# Patient Record
Sex: FEMALE | Race: WHITE | NOT HISPANIC OR LATINO
[De-identification: names, ages, dates, MRNs, and addresses within clinical notes are randomized per-mention and may not be internally consistent; named-entity substitution may affect disease eponyms.]

---

## 2017-05-05 ENCOUNTER — APPOINTMENT (OUTPATIENT)
Dept: OTOLARYNGOLOGY | Facility: CLINIC | Age: 72
End: 2017-05-05

## 2017-05-05 VITALS
SYSTOLIC BLOOD PRESSURE: 141 MMHG | HEART RATE: 73 BPM | TEMPERATURE: 97.8 F | DIASTOLIC BLOOD PRESSURE: 85 MMHG | OXYGEN SATURATION: 98 %

## 2017-05-05 DIAGNOSIS — J32.2 CHRONIC ETHMOIDAL SINUSITIS: ICD-10-CM

## 2017-07-06 ENCOUNTER — APPOINTMENT (OUTPATIENT)
Dept: RADIATION ONCOLOGY | Facility: CLINIC | Age: 72
End: 2017-07-06

## 2017-07-06 ENCOUNTER — OUTPATIENT (OUTPATIENT)
Dept: OUTPATIENT SERVICES | Facility: HOSPITAL | Age: 72
LOS: 1 days | Discharge: HOME | End: 2017-07-06

## 2017-12-15 ENCOUNTER — APPOINTMENT (OUTPATIENT)
Dept: OTOLARYNGOLOGY | Facility: CLINIC | Age: 72
End: 2017-12-15
Payer: COMMERCIAL

## 2017-12-15 VITALS
SYSTOLIC BLOOD PRESSURE: 132 MMHG | DIASTOLIC BLOOD PRESSURE: 86 MMHG | HEART RATE: 81 BPM | TEMPERATURE: 98.2 F | OXYGEN SATURATION: 98 %

## 2017-12-15 PROCEDURE — 92557 COMPREHENSIVE HEARING TEST: CPT

## 2017-12-15 PROCEDURE — 99214 OFFICE O/P EST MOD 30 MIN: CPT

## 2017-12-15 PROCEDURE — 92550 TYMPANOMETRY & REFLEX THRESH: CPT

## 2018-12-05 ENCOUNTER — APPOINTMENT (OUTPATIENT)
Dept: NEUROSURGERY | Facility: CLINIC | Age: 73
End: 2018-12-05
Payer: COMMERCIAL

## 2018-12-05 PROCEDURE — 99214 OFFICE O/P EST MOD 30 MIN: CPT

## 2018-12-05 RX ORDER — AZITHROMYCIN 250 MG/1
250 TABLET, FILM COATED ORAL
Qty: 6 | Refills: 0 | Status: DISCONTINUED | COMMUNITY
Start: 2017-05-05 | End: 2018-12-05

## 2018-12-17 ENCOUNTER — APPOINTMENT (OUTPATIENT)
Dept: RADIATION ONCOLOGY | Facility: CLINIC | Age: 73
End: 2018-12-17
Payer: COMMERCIAL

## 2018-12-17 VITALS
HEART RATE: 75 BPM | WEIGHT: 204.81 LBS | HEIGHT: 60 IN | SYSTOLIC BLOOD PRESSURE: 139 MMHG | DIASTOLIC BLOOD PRESSURE: 87 MMHG | RESPIRATION RATE: 16 BRPM | BODY MASS INDEX: 40.21 KG/M2

## 2018-12-17 PROCEDURE — 99215 OFFICE O/P EST HI 40 MIN: CPT

## 2018-12-17 NOTE — PHYSICAL EXAM
[General Appearance - Well Developed] : well developed [General Appearance - Well Nourished] : well nourished [Normal] : oriented to person, place and time, the affect was normal, the mood was normal and not anxious [Oriented To Time, Place, And Person] : oriented to person, place, and time

## 2018-12-17 NOTE — VITALS
[Maximal Pain Intensity: 0/10] : 0/10 [Least Pain Intensity: 0/10] : 0/10 [90: Able to carry normal activity; minor signs or symptoms of disease.] : 90: Able to carry normal activity; minor signs or symptoms of disease.  [Date: ____________] : Patient's last distress assessment performed on [unfilled].

## 2018-12-19 ENCOUNTER — FORM ENCOUNTER (OUTPATIENT)
Age: 73
End: 2018-12-19

## 2018-12-20 ENCOUNTER — OUTPATIENT (OUTPATIENT)
Dept: OUTPATIENT SERVICES | Facility: HOSPITAL | Age: 73
LOS: 1 days | End: 2018-12-20
Payer: COMMERCIAL

## 2018-12-20 ENCOUNTER — APPOINTMENT (OUTPATIENT)
Dept: NEUROSURGERY | Facility: CLINIC | Age: 73
End: 2018-12-20
Payer: COMMERCIAL

## 2018-12-20 ENCOUNTER — APPOINTMENT (OUTPATIENT)
Dept: MRI IMAGING | Facility: IMAGING CENTER | Age: 73
End: 2018-12-20

## 2018-12-20 DIAGNOSIS — D33.3 BENIGN NEOPLASM OF CRANIAL NERVES: ICD-10-CM

## 2018-12-20 PROCEDURE — A9585: CPT

## 2018-12-20 PROCEDURE — 61800 APPLY SRS HEADFRAME ADD-ON: CPT

## 2018-12-20 PROCEDURE — 82565 ASSAY OF CREATININE: CPT

## 2018-12-20 PROCEDURE — 61798 SRS CRANIAL LESION COMPLEX: CPT

## 2018-12-20 PROCEDURE — 76498 UNLISTED MR PROCEDURE: CPT

## 2018-12-20 NOTE — REVIEW OF SYSTEMS
[Fatigue] : fatigue [Loss of Hearing] : loss of hearing [Easy Bruising] : a tendency for easy bruising [Negative] : Psychiatric [Fever] : no fever [Chills] : no chills [Night Sweats] : no night sweats [FreeTextEntry4] : decreased hearing to right ear when daignosed with schwannoma, 3 years ago.  [de-identified] : once in a while has slight trouble with balance and leaning to one side or another, denies headaches.

## 2018-12-20 NOTE — HISTORY OF PRESENT ILLNESS
[FreeTextEntry1] : Ms. Littlejohn presents today for consideration for radiation therapy for a right sided acoustic neuroma. \par \par She was initially diagnosed in 1/2016 at which time she presented with right sided sensorineural hearing loss.  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 She was treated with a short course of steroids and recommended surveillance imaging. MRI 7/13/16 showed the lesion stable in size measuring 7X 4 mm.  5/30/17 MRI showed increase in size of the lesion now measuring 1.2 cm with slight protrusion into the CPA angle.  She has notably seen numerous neurosurgeons and ENTs and radiation oncologists who have recommended definitive radiosurgery. \par \par Over the last year she has had episodse of vertigo that resolve.  Her hearing is slightly worse in the right ear.  She denies any gait difficulty, facial numbness, facial weakness, double vision, decreased coordination, or change in her swallowing. \par

## 2018-12-28 DIAGNOSIS — D33.3 BENIGN NEOPLASM OF CRANIAL NERVES: ICD-10-CM

## 2019-02-07 ENCOUNTER — APPOINTMENT (OUTPATIENT)
Dept: RADIATION ONCOLOGY | Facility: CLINIC | Age: 74
End: 2019-02-07
Payer: COMMERCIAL

## 2019-02-07 VITALS
DIASTOLIC BLOOD PRESSURE: 87 MMHG | TEMPERATURE: 98.78 F | SYSTOLIC BLOOD PRESSURE: 141 MMHG | RESPIRATION RATE: 16 BRPM | HEART RATE: 88 BPM | OXYGEN SATURATION: 100 %

## 2019-02-07 VITALS — WEIGHT: 206.9 LBS | BODY MASS INDEX: 40.41 KG/M2

## 2019-02-07 PROCEDURE — 99024 POSTOP FOLLOW-UP VISIT: CPT

## 2019-02-07 NOTE — REVIEW OF SYSTEMS
[Negative] : Heme/Lymph [Dysphagia] : no dysphagia [Gait Disturbance] : no gait disturbance [Confused] : no confusion [Dizziness] : no dizziness [Fainting] : no fainting [Difficulty Walking] : no difficulty walking [FreeTextEntry4] : hearing is decreased on the right side

## 2019-02-07 NOTE — LETTER CLOSING
[Sincerely yours,] : Sincerely yours, [FreeTextEntry3] : Adama Aceves MD\par Attending Physician\par Department of Radiation Medicine\par \par \par

## 2019-02-07 NOTE — PHYSICAL EXAM
[General Appearance - Well Developed] : well developed [General Appearance - Well Nourished] : well nourished [No Focal Deficits] : no focal deficits [Normal] : oriented to person, place and time, the affect was normal, the mood was normal and not anxious [Oriented To Time, Place, And Person] : oriented to person, place, and time

## 2019-05-08 ENCOUNTER — FORM ENCOUNTER (OUTPATIENT)
Age: 74
End: 2019-05-08

## 2019-05-09 ENCOUNTER — APPOINTMENT (OUTPATIENT)
Dept: NEUROSURGERY | Facility: CLINIC | Age: 74
End: 2019-05-09
Payer: COMMERCIAL

## 2019-05-09 ENCOUNTER — OUTPATIENT (OUTPATIENT)
Dept: OUTPATIENT SERVICES | Facility: HOSPITAL | Age: 74
LOS: 1 days | End: 2019-05-09
Payer: COMMERCIAL

## 2019-05-09 ENCOUNTER — APPOINTMENT (OUTPATIENT)
Dept: MRI IMAGING | Facility: IMAGING CENTER | Age: 74
End: 2019-05-09
Payer: COMMERCIAL

## 2019-05-09 DIAGNOSIS — H91.21 SUDDEN IDIOPATHIC HEARING LOSS, RIGHT EAR: ICD-10-CM

## 2019-05-09 DIAGNOSIS — G51.31 CLONIC HEMIFACIAL SPASM, RIGHT: ICD-10-CM

## 2019-05-09 DIAGNOSIS — D33.3 BENIGN NEOPLASM OF CRANIAL NERVES: ICD-10-CM

## 2019-05-09 PROCEDURE — 70553 MRI BRAIN STEM W/O & W/DYE: CPT | Mod: 26

## 2019-05-09 PROCEDURE — 99212 OFFICE O/P EST SF 10 MIN: CPT

## 2019-05-09 PROCEDURE — 70553 MRI BRAIN STEM W/O & W/DYE: CPT

## 2019-05-09 PROCEDURE — A9585: CPT

## 2019-05-10 VITALS
SYSTOLIC BLOOD PRESSURE: 138 MMHG | HEART RATE: 102 BPM | DIASTOLIC BLOOD PRESSURE: 84 MMHG | TEMPERATURE: 209.84 F | RESPIRATION RATE: 16 BRPM | OXYGEN SATURATION: 99 %

## 2019-05-10 RX ORDER — PREDNISONE 20 MG/1
20 TABLET ORAL DAILY
Qty: 30 | Refills: 0 | Status: DISCONTINUED | COMMUNITY
Start: 2019-02-07 | End: 2019-05-10

## 2019-05-10 NOTE — ASSESSMENT
[FreeTextEntry1] : Discussion:\par -Reviewed MRI results with the patient. MRI- tumor is slightly larger as expected. This change is directly related to treatment changes from the gamma knife. It is expects to decrease in slightly over the next 12 months. \par -Next MRI can be done without contrast in 1 year. \par -Office evaluation in 1 year or sooner if new symptoms develop.

## 2019-05-10 NOTE — REASON FOR VISIT
[Follow-Up: _____] : a [unfilled] follow-up visit [FreeTextEntry1] : Patient with three episodes of right facial spasm. [place] episodes on Saturday and one on Monday morning. Each episode lasted less than a minute and resolved without intervention. No other symptoms. No hearing the right ear.\par Facial sensation and muscle strength intact. No headaches, dizziness, n/v.

## 2019-05-10 NOTE — PHYSICAL EXAM
[General Appearance - In No Acute Distress] : in no acute distress [General Appearance - Alert] : alert [Affect] : the affect was normal [Oriented To Time, Place, And Person] : oriented to person, place, and time [Impaired Insight] : insight and judgment were intact [Person] : oriented to person [Time] : oriented to time [Place] : oriented to place [Span Intact] : the attention span was normal [Remote Intact] : remote memory intact [Short Term Intact] : short term memory intact [Concentration Intact] : normal concentrating ability [Fluency] : fluency intact [Current Events] : adequate knowledge of current events [Comprehension] : comprehension intact [Past History] : adequate knowledge of personal past history [Cranial Nerves Oculomotor (III)] : extraocular motion intact [Vocabulary] : adequate range of vocabulary [Cranial Nerves Optic (II)] : visual acuity intact bilaterally,  pupils equal round and reactive to light [Cranial Nerves Facial (VII)] : face symmetrical [Cranial Nerves Vestibulocochlear (VIII)] : hearing was intact bilaterally [Cranial Nerves Trigeminal (V)] : facial sensation intact symmetrically [Cranial Nerves Accessory (XI - Cranial And Spinal)] : head turning and shoulder shrug symmetric [Cranial Nerves Glossopharyngeal (IX)] : tongue and palate midline [Cranial Nerves Hypoglossal (XII)] : there was no tongue deviation with protrusion [Motor Strength] : muscle strength was normal in all four extremities [No Muscle Atrophy] : normal bulk in all four extremities [Romberg's Sign] : Romberg's sign was negtive [Motor Handedness Right-Handed] : the patient is right hand dominant [Sensation Tactile Decrease] : light touch was intact [Past-pointing] : there was no past-pointing [Balance] : balance was intact [Tremor] : no tremor present [2+] : Patella left 2+ [Sclera] : the sclera and conjunctiva were normal [PERRL With Normal Accommodation] : pupils were equal in size, round, reactive to light, with normal accommodation [Extraocular Movements] : extraocular movements were intact [20/___] : both eyes 20/[unfilled] [Full Visual Field] : full visual field [Hearing Threshold Finger Rub Not [place]] : hearing was normal [Outer Ear] : the ears and nose were normal in appearance [Neck Cervical Mass (___cm)] : no neck mass was observed [Oropharynx] : the oropharynx was normal [Neck Appearance] : the appearance of the neck was normal [Thyroid Diffuse Enlargement] : the thyroid was not enlarged [Thyroid Nodule] : there were no palpable thyroid nodules [Jugular Venous Distention Increased] : there was no jugular-venous distention [Auscultation Breath Sounds / Voice Sounds] : lungs were clear to auscultation bilaterally [Abnormal Walk] : normal gait [Motor Tone] : muscle strength and tone were normal [Musculoskeletal - Swelling] : no joint swelling seen [Nail Clubbing] : no clubbing  or cyanosis of the fingernails [] : no rash [Skin Turgor] : normal skin turgor [Skin Color & Pigmentation] : normal skin color and pigmentation

## 2019-05-10 NOTE — DATA REVIEWED
[de-identified] : \par \par EXAM: MR BRAIN WAW IC \par \par \par PROCEDURE DATE: 05/09/2019 \par \par \par \par INTERPRETATION: Contrast-enhanced MRI of the brain. \par \par CLINICAL INDICATION: Follow-up right-sided vestibular schwannoma. \par \par TECHNIQUE: Multiplanar, multisequence MR images of the brain were obtained \par before and after the intravenous administration of 10 cc of Gadavist. 0 cc \par were discarded. Special attention was paid to the IACs. \par \par COMPARISON: MRI brain 12/20/2018 \par \par FINDINGS: \par \par There is a 1.7 x 0.9 cm enhancing lesion in the right cerebellopontine angle \par and right internal auditory canal consistent with the presence of a \par vestibular schwannoma. Previously, the lesion measured 1.4 x 0.8 cm. No mass \par effect upon the brainstem. \par \par No hydrocephalus, midline shift, mass effect, acute intracranial hemorrhage, \par or acute infarction. Mild white matter microvascular ischemic disease is \par noted. Signal voids are seen within the major intracranial vessels \par consistent with their patency. \par \par No abnormal parenchymal or leptomeningeal enhancement. \par \par Small polyp/retention cyst in left maxillary sinus, otherwise the visualized \par paranasal sinuses and mastoid air cells are clear. The orbits, sellar and \par suprasellar structures, and craniocervical junction are unremarkable. \par \par The calvarial and visualized vertebral marrow signal is low on T1-weighted \par images suggesting the presence of anemia or an infiltrative process. \par \par IMPRESSION: \par \par Slight interval increase in size of a right vestibular schwannoma, currently \par 1.7 x 0.9 cm, previously 1.4 x 0.8 cm. No mass effect upon the brainstem. \par \par No abnormal parenchymal or leptomeningeal enhancement. \par \par No acute intracranial hemorrhage, mass effect, vasogenic edema, or evidence \par of acute territorial infarct. \par \par Calvarial and visualized vertebral marrow signal is low on T1-weighted \par images suggesting the presence of anemia or an infiltrative process. \par \par \par \par \par \par \par \par \par \par \par \par SU POWELL M.D., ATTENDING RADIOLOGIST \par This document has been electronically signed. May 9 2019 4:45PM \par \par \par \par \par \par \par    \par \par \par \par \par \par \par \par \par \par \par \par \par \par \par \par      \par \par \par EXAM: MR BRAIN WAW IC \par \par \par PROCEDURE DATE: 05/09/2019 \par \par \par \par INTERPRETATION: Contrast-enhanced MRI of the brain. \par \par CLINICAL INDICATION: Follow-up right-sided vestibular schwannoma. \par \par TECHNIQUE: Multiplanar, multisequence MR images of the brain were obtained \par before and after the intravenous administration of 10 cc of Gadavist. 0 cc \par were discarded. Special attention was paid to the IACs. \par \par COMPARISON: MRI brain 12/20/2018 \par \par FINDINGS: \par \par There is a 1.7 x 0.9 cm enhancing lesion in the right cerebellopontine angle \par and right internal auditory canal consistent with the presence of a \par vestibular schwannoma. Previously, the lesion measured 1.4 x 0.8 cm. No mass \par effect upon the brainstem. \par \par No hydrocephalus, midline shift, mass effect, acute intracranial hemorrhage, \par or acute infarction. Mild white matter microvascular ischemic disease is \par noted. Signal voids are seen within the major intracranial vessels \par consistent with their patency. \par \par No abnormal parenchymal or leptomeningeal enhancement. \par \par Small polyp/retention cyst in left maxillary sinus, otherwise the visualized \par paranasal sinuses and mastoid air cells are clear. The orbits, sellar and \par suprasellar structures, and craniocervical junction are unremarkable. \par \par The calvarial and visualized vertebral marrow signal is low on T1-weighted \par images suggesting the presence of anemia or an infiltrative process. \par \par IMPRESSION: \par \par Slight interval increase in size of a right vestibular schwannoma, currently \par 1.7 x 0.9 cm, previously 1.4 x 0.8 cm. No mass effect upon the brainstem. \par \par No abnormal parenchymal or leptomeningeal enhancement. \par \par No acute intracranial hemorrhage, mass effect, vasogenic edema, or evidence \par of acute territorial infarct. \par \par Calvarial and visualized vertebral marrow signal is low on T1-weighted \par images suggesting the presence of anemia or an infiltrative process. \par \par \par \par \par \par \par \par \par \par \par \par SU POWELL M.D., ATTENDING RADIOLOGIST \par This document has been electronically signed. May 9 2019 4:45PM \par \par \par \par \par \par \par    \par \par \par \par EXAM: MR BRAIN WAW IC \par \par \par PROCEDURE DATE: 05/09/2019 \par \par \par \par INTERPRETATION: Contrast-enhanced MRI of the brain. \par \par CLINICAL INDICATION: Follow-up right-sided vestibular schwannoma. \par \par TECHNIQUE: Multiplanar, multisequence MR images of the brain were obtained \par before and after the intravenous administration of 10 cc of Gadavist. 0 cc \par were discarded. Special attention was paid to the IACs. \par \par COMPARISON: MRI brain 12/20/2018 \par \par FINDINGS: \par \par There is a 1.7 x 0.9 cm enhancing lesion in the right cerebellopontine angle \par and right internal auditory canal consistent with the presence of a \par vestibular schwannoma. Previously, the lesion measured 1.4 x 0.8 cm. No mass \par effect upon the brainstem. \par \par No hydrocephalus, midline shift, mass effect, acute intracranial hemorrhage, \par or acute infarction. Mild white matter microvascular ischemic disease is \par noted. Signal voids are seen within the major intracranial vessels \par consistent with their patency. \par \par No abnormal parenchymal or leptomeningeal enhancement. \par \par Small polyp/retention cyst in left maxillary sinus, otherwise the visualized \par paranasal sinuses and mastoid air cells are clear. The orbits, sellar and \par suprasellar structures, and craniocervical junction are unremarkable. \par \par The calvarial and visualized vertebral marrow signal is low on T1-weighted \par images suggesting the presence of anemia or an infiltrative process. \par \par IMPRESSION: \par \par Slight interval increase in size of a right vestibular schwannoma, currently \par 1.7 x 0.9 cm, previously 1.4 x 0.8 cm. No mass effect upon the brainstem. \par \par No abnormal parenchymal or leptomeningeal enhancement. \par \par No acute intracranial hemorrhage, mass effect, vasogenic edema, or evidence \par of acute territorial infarct. \par \par Calvarial and visualized vertebral marrow signal is low on T1-weighted \par images suggesting the presence of anemia or an infiltrative process. \par \par \par \par \par \par \par \par \par \par \par \par SU POWELL M.D., ATTENDING RADIOLOGIST \par This document has been electronically signed. May 9 2019 4:45PM \par \par \par \par \par \par \par    \par \par \par \par \par \par \par \par \par \par \par \par \par \par \par \par      \par \par \par \par \par \par \par \par \par \par \par \par \par \par \par      \par

## 2020-02-10 ENCOUNTER — APPOINTMENT (OUTPATIENT)
Dept: MRI IMAGING | Facility: IMAGING CENTER | Age: 75
End: 2020-02-10
Payer: COMMERCIAL

## 2020-02-10 ENCOUNTER — APPOINTMENT (OUTPATIENT)
Dept: NEUROSURGERY | Facility: CLINIC | Age: 75
End: 2020-02-10
Payer: COMMERCIAL

## 2020-02-10 ENCOUNTER — OUTPATIENT (OUTPATIENT)
Dept: OUTPATIENT SERVICES | Facility: HOSPITAL | Age: 75
LOS: 1 days | End: 2020-02-10
Payer: COMMERCIAL

## 2020-02-10 DIAGNOSIS — D33.3 BENIGN NEOPLASM OF CRANIAL NERVES: ICD-10-CM

## 2020-02-10 PROCEDURE — 99211 OFF/OP EST MAY X REQ PHY/QHP: CPT

## 2020-02-10 PROCEDURE — 70551 MRI BRAIN STEM W/O DYE: CPT

## 2020-02-10 PROCEDURE — 70551 MRI BRAIN STEM W/O DYE: CPT | Mod: 26

## 2020-02-14 ENCOUNTER — APPOINTMENT (OUTPATIENT)
Dept: MRI IMAGING | Facility: IMAGING CENTER | Age: 75
End: 2020-02-14

## 2020-05-13 ENCOUNTER — APPOINTMENT (OUTPATIENT)
Dept: NEUROSURGERY | Facility: CLINIC | Age: 75
End: 2020-05-13

## 2020-05-13 ENCOUNTER — APPOINTMENT (OUTPATIENT)
Dept: MRI IMAGING | Facility: IMAGING CENTER | Age: 75
End: 2020-05-13

## 2020-05-16 ENCOUNTER — OUTPATIENT (OUTPATIENT)
Dept: OUTPATIENT SERVICES | Facility: HOSPITAL | Age: 75
LOS: 1 days | End: 2020-05-16

## 2020-05-16 ENCOUNTER — APPOINTMENT (OUTPATIENT)
Dept: MRI IMAGING | Facility: IMAGING CENTER | Age: 75
End: 2020-05-16

## 2020-05-16 DIAGNOSIS — D33.3 BENIGN NEOPLASM OF CRANIAL NERVES: ICD-10-CM

## 2020-05-30 ENCOUNTER — TRANSCRIPTION ENCOUNTER (OUTPATIENT)
Age: 75
End: 2020-05-30

## 2021-03-24 ENCOUNTER — OUTPATIENT (OUTPATIENT)
Dept: OUTPATIENT SERVICES | Facility: HOSPITAL | Age: 76
LOS: 1 days | End: 2021-03-24
Payer: COMMERCIAL

## 2021-03-24 ENCOUNTER — APPOINTMENT (OUTPATIENT)
Dept: NEUROSURGERY | Facility: CLINIC | Age: 76
End: 2021-03-24
Payer: COMMERCIAL

## 2021-03-24 ENCOUNTER — APPOINTMENT (OUTPATIENT)
Dept: MRI IMAGING | Facility: IMAGING CENTER | Age: 76
End: 2021-03-24
Payer: COMMERCIAL

## 2021-03-24 VITALS
OXYGEN SATURATION: 99 % | RESPIRATION RATE: 16 BRPM | HEART RATE: 98 BPM | SYSTOLIC BLOOD PRESSURE: 128 MMHG | DIASTOLIC BLOOD PRESSURE: 64 MMHG

## 2021-03-24 DIAGNOSIS — D33.3 BENIGN NEOPLASM OF CRANIAL NERVES: ICD-10-CM

## 2021-03-24 DIAGNOSIS — Z00.8 ENCOUNTER FOR OTHER GENERAL EXAMINATION: ICD-10-CM

## 2021-03-24 DIAGNOSIS — H90.41 SENSORINEURAL HEARING LOSS, UNILATERAL, RIGHT EAR, WITH UNRESTRICTED HEARING ON THE CONTRALATERAL SIDE: ICD-10-CM

## 2021-03-24 PROCEDURE — 99213 OFFICE O/P EST LOW 20 MIN: CPT

## 2021-03-24 PROCEDURE — 70553 MRI BRAIN STEM W/O & W/DYE: CPT

## 2021-03-24 PROCEDURE — A9585: CPT

## 2021-03-24 PROCEDURE — 99072 ADDL SUPL MATRL&STAF TM PHE: CPT

## 2021-03-24 PROCEDURE — 70553 MRI BRAIN STEM W/O & W/DYE: CPT | Mod: 26

## 2021-03-24 NOTE — ASSESSMENT
[FreeTextEntry1] : Discussion:\par - Reviewed MRI results. Stable exam, no regrowth noted. \par - No intervention indicated.\par - Recommend COVID vaccination.\par - Patient encouraged to increase activity, walking, hydration to improve endurance and balance.\par - Follow-up MRI no contrast in 1 year with a follow-up clinical evaluation.

## 2021-03-24 NOTE — PHYSICAL EXAM
[General Appearance - Alert] : alert [General Appearance - In No Acute Distress] : in no acute distress [Oriented To Time, Place, And Person] : oriented to person, place, and time [Impaired Insight] : insight and judgment were intact [Affect] : the affect was normal [Person] : oriented to person [Place] : oriented to place [Time] : oriented to time [Short Term Intact] : short term memory intact [Remote Intact] : remote memory intact [Span Intact] : the attention span was normal [Concentration Intact] : normal concentrating ability [Fluency] : fluency intact [Comprehension] : comprehension intact [Current Events] : adequate knowledge of current events [Past History] : adequate knowledge of personal past history [Vocabulary] : adequate range of vocabulary [Cranial Nerves Optic (II)] : visual acuity intact bilaterally,  pupils equal round and reactive to light [Cranial Nerves Oculomotor (III)] : extraocular motion intact [Cranial Nerves Trigeminal (V)] : facial sensation intact symmetrically [Cranial Nerves Facial (VII)] : face symmetrical [Cranial Nerves Vestibulocochlear (VIII)] : hearing was intact bilaterally [Cranial Nerves Glossopharyngeal (IX)] : tongue and palate midline [Cranial Nerves Accessory (XI - Cranial And Spinal)] : head turning and shoulder shrug symmetric [Cranial Nerves Hypoglossal (XII)] : there was no tongue deviation with protrusion [Motor Strength] : muscle strength was normal in all four extremities [No Muscle Atrophy] : normal bulk in all four extremities [Sensation Tactile Decrease] : light touch was intact [Romberg's Sign] : Romberg's sign was negtive [Balance] : balance was intact [Limited Balance] : balance was intact [Past-pointing] : there was no past-pointing [Tremor] : no tremor present [2+] : Patella left 2+ [Sclera] : the sclera and conjunctiva were normal [PERRL With Normal Accommodation] : pupils were equal in size, round, reactive to light, with normal accommodation [Extraocular Movements] : extraocular movements were intact [Outer Ear] : the ears and nose were normal in appearance [Oropharynx] : the oropharynx was normal [FreeTextEntry1] : diminished hearing in the right ear [Abnormal Walk] : normal gait [Nail Clubbing] : no clubbing  or cyanosis of the fingernails [Musculoskeletal - Swelling] : no joint swelling seen [Motor Tone] : muscle strength and tone were normal [Skin Color & Pigmentation] : normal skin color and pigmentation [Skin Turgor] : normal skin turgor [] : no rash

## 2021-03-24 NOTE — REASON FOR VISIT
[Follow-Up: _____] : a [unfilled] follow-up visit [Family Member] : family member [FreeTextEntry1] : Patient reports being in good health. She continues to work full time remotely from her home. \par She denies HA, n/v, dizziness, facial numbness, tinnitus, or balance disturbance.\par Hearing in the right ear remains diminished but stable, no further hearing loss. She has some functional hearing. She is able to differentiate directions of loud sounds but unable to use a phone in that ear.\par She has had a recent weight loss of approximately 50 lbs. Her hematologist did a full work-up for her CLL which was negative. PET was negative per the patient/daughter. She has never been on treatment for CCL. Routine blood work every 3 months has unremarkable.

## 2022-03-23 ENCOUNTER — APPOINTMENT (OUTPATIENT)
Dept: NEUROSURGERY | Facility: CLINIC | Age: 77
End: 2022-03-23
Payer: COMMERCIAL

## 2022-03-23 ENCOUNTER — OUTPATIENT (OUTPATIENT)
Dept: OUTPATIENT SERVICES | Facility: HOSPITAL | Age: 77
LOS: 1 days | End: 2022-03-23
Payer: COMMERCIAL

## 2022-03-23 ENCOUNTER — APPOINTMENT (OUTPATIENT)
Dept: MRI IMAGING | Facility: IMAGING CENTER | Age: 77
End: 2022-03-23
Payer: COMMERCIAL

## 2022-03-23 DIAGNOSIS — D33.3 BENIGN NEOPLASM OF CRANIAL NERVES: ICD-10-CM

## 2022-03-23 PROCEDURE — 70551 MRI BRAIN STEM W/O DYE: CPT | Mod: 26

## 2022-03-23 PROCEDURE — 99212 OFFICE O/P EST SF 10 MIN: CPT

## 2022-03-23 PROCEDURE — 70551 MRI BRAIN STEM W/O DYE: CPT

## 2022-03-23 NOTE — REASON FOR VISIT
[Follow-Up: _____] : a [unfilled] follow-up visit [Family Member] : family member [FreeTextEntry1] : 75F, s/p Gamma Knife SRS in 12/2018 for Right vestibular schwannoma. Patient is clinically neurologically stable compared to when she was last seen in our office last year. Hearing is minimal from the Right ear, but this has remained stable since her treatment. Her MRI today shows a stable Right vestibular schwannoma lesion compared to 3/2021's MRI

## 2022-03-23 NOTE — DATA REVIEWED
[de-identified] : MRI Stereo Brain w/wo today shows stable Right vestibular schwannoma compared to 3/2021.

## 2022-03-23 NOTE — ASSESSMENT
[FreeTextEntry1] : 76F, s/p GK SRS for Right vestibular schwannoma in 12/2018, who has a stable neurological exam and MRI imaging.

## 2022-03-23 NOTE — PHYSICAL EXAM
[Oriented To Time, Place, And Person] : oriented to person, place, and time [Cranial Nerves Oculomotor (III)] : extraocular motion intact [Cranial Nerves Optic (II)] : visual acuity intact bilaterally,  pupils equal round and reactive to light [Cranial Nerves Trigeminal (V)] : facial sensation intact symmetrically [Cranial Nerves Facial (VII)] : face symmetrical [Cranial Nerves Glossopharyngeal (IX)] : tongue and palate midline [Motor Tone] : muscle tone was normal in all four extremities [Cranial Nerves Hypoglossal (XII)] : there was no tongue deviation with protrusion [Motor Strength] : muscle strength was normal in all four extremities [Sensation Tactile Decrease] : light touch was intact [Abnormal Walk] : normal gait [Balance] : balance was intact

## 2023-03-19 ENCOUNTER — APPOINTMENT (OUTPATIENT)
Dept: MRI IMAGING | Facility: IMAGING CENTER | Age: 78
End: 2023-03-19
Payer: MEDICARE

## 2023-03-19 ENCOUNTER — OUTPATIENT (OUTPATIENT)
Dept: OUTPATIENT SERVICES | Facility: HOSPITAL | Age: 78
LOS: 1 days | End: 2023-03-19
Payer: MEDICARE

## 2023-03-19 DIAGNOSIS — D33.3 BENIGN NEOPLASM OF CRANIAL NERVES: ICD-10-CM

## 2023-03-19 PROCEDURE — 70551 MRI BRAIN STEM W/O DYE: CPT

## 2023-03-19 PROCEDURE — 70551 MRI BRAIN STEM W/O DYE: CPT | Mod: 26,MH

## 2023-03-29 ENCOUNTER — APPOINTMENT (OUTPATIENT)
Dept: NEUROSURGERY | Facility: CLINIC | Age: 78
End: 2023-03-29
Payer: MEDICARE

## 2023-03-29 PROCEDURE — 99441: CPT | Mod: 95

## 2023-04-04 DIAGNOSIS — D33.3 BENIGN NEOPLASM OF CRANIAL NERVES: ICD-10-CM

## 2023-04-05 ENCOUNTER — APPOINTMENT (OUTPATIENT)
Dept: NEUROSURGERY | Facility: CLINIC | Age: 78
End: 2023-04-05

## 2024-03-27 ENCOUNTER — OUTPATIENT (OUTPATIENT)
Dept: OUTPATIENT SERVICES | Facility: HOSPITAL | Age: 79
LOS: 1 days | End: 2024-03-27
Payer: MEDICARE

## 2024-03-27 ENCOUNTER — APPOINTMENT (OUTPATIENT)
Dept: MRI IMAGING | Facility: IMAGING CENTER | Age: 79
End: 2024-03-27
Payer: MEDICARE

## 2024-03-27 ENCOUNTER — APPOINTMENT (OUTPATIENT)
Dept: NEUROSURGERY | Facility: CLINIC | Age: 79
End: 2024-03-27
Payer: MEDICARE

## 2024-03-27 VITALS
WEIGHT: 200 LBS | OXYGEN SATURATION: 97 % | TEMPERATURE: 98.4 F | HEART RATE: 100 BPM | RESPIRATION RATE: 16 BRPM | HEIGHT: 60 IN | SYSTOLIC BLOOD PRESSURE: 138 MMHG | DIASTOLIC BLOOD PRESSURE: 97 MMHG | BODY MASS INDEX: 39.27 KG/M2

## 2024-03-27 DIAGNOSIS — D33.3 BENIGN NEOPLASM OF CRANIAL NERVES: ICD-10-CM

## 2024-03-27 PROCEDURE — 70551 MRI BRAIN STEM W/O DYE: CPT

## 2024-03-27 PROCEDURE — 99212 OFFICE O/P EST SF 10 MIN: CPT

## 2024-03-27 PROCEDURE — 70551 MRI BRAIN STEM W/O DYE: CPT | Mod: 26,MH

## 2024-03-28 NOTE — ASSESSMENT
[FreeTextEntry1] : Discussion:  MRI is stable Repeat MRI w/o contrast in 2 years.  .IManjeet evaluated the patient with the nurse practitioner Joann Barthelemy and established the plan of care. I personally discuss this patient with the nurse practitioner at the time of the visit. I agree with the assessment and plan as written, unless noted below.

## 2024-03-28 NOTE — REASON FOR VISIT
[Follow-Up: _____] : a [unfilled] follow-up visit [Family Member] : family member [FreeTextEntry1] : 78F, s/p Gamma Knife SRS in 12/2018 for Right vestibular schwannoma. Patient is clinically neurologically stable. Hearing is minimal in the Right ear, but this has remained stable since her treatment.   Today she reports feeling well and offers no c/o.

## 2024-03-28 NOTE — REVIEW OF SYSTEMS
[As Noted in HPI] : as noted in HPI [Loss Of Hearing] : hearing loss [Negative] : Respiratory [FreeTextEntry4] : right ear

## 2024-03-28 NOTE — PHYSICAL EXAM
[General Appearance - Alert] : alert [General Appearance - In No Acute Distress] : in no acute distress [General Appearance - Well Nourished] : well nourished [Oriented To Time, Place, And Person] : oriented to person, place, and time [Impaired Insight] : insight and judgment were intact [Affect] : the affect was normal [Person] : oriented to person [Place] : oriented to place [Time] : oriented to time [Abnormal Walk] : normal gait [Outer Ear] : the ears and nose were normal in appearance [FreeTextEntry1] : diminished hearing in right ear

## 2025-05-06 DIAGNOSIS — D33.3 BENIGN NEOPLASM OF CRANIAL NERVES: ICD-10-CM

## 2025-07-15 ENCOUNTER — NON-APPOINTMENT (OUTPATIENT)
Age: 80
End: 2025-07-15

## 2025-07-16 ENCOUNTER — APPOINTMENT (OUTPATIENT)
Dept: PHYSICAL MEDICINE AND REHAB | Facility: CLINIC | Age: 80
End: 2025-07-16
Payer: MEDICARE

## 2025-07-16 VITALS
BODY MASS INDEX: 39.27 KG/M2 | HEIGHT: 60 IN | HEART RATE: 91 BPM | RESPIRATION RATE: 14 BRPM | SYSTOLIC BLOOD PRESSURE: 128 MMHG | WEIGHT: 200 LBS | DIASTOLIC BLOOD PRESSURE: 89 MMHG

## 2025-07-16 PROCEDURE — 99203 OFFICE O/P NEW LOW 30 MIN: CPT

## 2025-07-16 RX ORDER — LEVOFLOXACIN 500 MG/1
500 TABLET, FILM COATED ORAL
Refills: 0 | Status: ACTIVE | COMMUNITY

## 2025-07-16 RX ORDER — ACALABRUTINIB 100 MG/1
100 TABLET, FILM COATED ORAL
Refills: 0 | Status: ACTIVE | COMMUNITY

## 2025-07-18 ENCOUNTER — NON-APPOINTMENT (OUTPATIENT)
Age: 80
End: 2025-07-18

## 2025-07-18 ENCOUNTER — APPOINTMENT (OUTPATIENT)
Dept: VASCULAR SURGERY | Facility: CLINIC | Age: 80
End: 2025-07-18
Payer: MEDICARE

## 2025-07-18 VITALS
HEIGHT: 60 IN | SYSTOLIC BLOOD PRESSURE: 108 MMHG | WEIGHT: 200 LBS | DIASTOLIC BLOOD PRESSURE: 75 MMHG | TEMPERATURE: 97.7 F | BODY MASS INDEX: 39.27 KG/M2 | HEART RATE: 101 BPM

## 2025-07-18 VITALS — HEART RATE: 98 BPM | SYSTOLIC BLOOD PRESSURE: 120 MMHG | DIASTOLIC BLOOD PRESSURE: 88 MMHG

## 2025-07-18 PROBLEM — I89.0 LYMPHEDEMA: Status: ACTIVE | Noted: 2025-07-18

## 2025-07-18 PROCEDURE — 99204 OFFICE O/P NEW MOD 45 MIN: CPT

## 2025-08-07 ENCOUNTER — NON-APPOINTMENT (OUTPATIENT)
Age: 80
End: 2025-08-07

## 2025-08-20 ENCOUNTER — APPOINTMENT (OUTPATIENT)
Dept: PHYSICAL MEDICINE AND REHAB | Facility: CLINIC | Age: 80
End: 2025-08-20

## 2025-08-29 ENCOUNTER — APPOINTMENT (OUTPATIENT)
Dept: VASCULAR SURGERY | Facility: CLINIC | Age: 80
End: 2025-08-29

## 2025-08-29 VITALS
WEIGHT: 200 LBS | SYSTOLIC BLOOD PRESSURE: 110 MMHG | HEART RATE: 74 BPM | TEMPERATURE: 97.6 F | HEIGHT: 56 IN | DIASTOLIC BLOOD PRESSURE: 71 MMHG | BODY MASS INDEX: 44.99 KG/M2

## 2025-08-29 VITALS — SYSTOLIC BLOOD PRESSURE: 108 MMHG | HEART RATE: 82 BPM | DIASTOLIC BLOOD PRESSURE: 76 MMHG

## 2025-08-29 PROCEDURE — 99213 OFFICE O/P EST LOW 20 MIN: CPT
